# Patient Record
Sex: MALE | Race: WHITE | NOT HISPANIC OR LATINO | URBAN - NONMETROPOLITAN AREA
[De-identification: names, ages, dates, MRNs, and addresses within clinical notes are randomized per-mention and may not be internally consistent; named-entity substitution may affect disease eponyms.]

---

## 2018-03-28 ENCOUNTER — TELEPHONE (OUTPATIENT)
Dept: ADMINISTRATIVE | Facility: HOSPITAL | Age: 38
End: 2018-03-28

## 2018-03-28 NOTE — TELEPHONE ENCOUNTER
Patient returned my call regarding cardiology appt scheduled.  I advised him of appt date/time.  Patient stated he would rather see Dr. Harrell instead.  I explained Dr. Harrell was not part of our group, Referring provider would have to send his referral to Dr. Harrell's office, we do not handle his appts.  I tried to contact Payal Washington's office, was not able to speak with anyone, so instead I sent fax back with all the above information and asked they contact patient back.